# Patient Record
Sex: MALE | Race: WHITE | Employment: OTHER | URBAN - METROPOLITAN AREA
[De-identification: names, ages, dates, MRNs, and addresses within clinical notes are randomized per-mention and may not be internally consistent; named-entity substitution may affect disease eponyms.]

---

## 2024-11-20 ENCOUNTER — APPOINTMENT (OUTPATIENT)
Dept: GENERAL RADIOLOGY | Age: 79
End: 2024-11-20
Payer: MEDICARE

## 2024-11-20 ENCOUNTER — HOSPITAL ENCOUNTER (EMERGENCY)
Age: 79
Discharge: HOME | End: 2024-11-21
Attending: STUDENT IN AN ORGANIZED HEALTH CARE EDUCATION/TRAINING PROGRAM
Payer: MEDICARE

## 2024-11-20 DIAGNOSIS — R73.09 ELEVATED GLUCOSE LEVEL: ICD-10-CM

## 2024-11-20 DIAGNOSIS — N18.9 CHRONIC KIDNEY DISEASE, UNSPECIFIED CKD STAGE: ICD-10-CM

## 2024-11-20 DIAGNOSIS — B34.9 VIRAL ILLNESS: Primary | ICD-10-CM

## 2024-11-20 DIAGNOSIS — R50.9 FEVER, UNSPECIFIED FEVER CAUSE: ICD-10-CM

## 2024-11-20 LAB
ANION GAP SERPL CALCULATED.3IONS-SCNC: 14 MMOL/L (ref 9–17)
BASOPHILS # BLD: 0.26 K/UL (ref 0–0.2)
BASOPHILS NFR BLD: 2 % (ref 0–2)
BUN SERPL-MCNC: 24 MG/DL (ref 8–23)
CALCIUM SERPL-MCNC: 9 MG/DL (ref 8.6–10.4)
CHLORIDE SERPL-SCNC: 105 MMOL/L (ref 98–107)
CO2 SERPL-SCNC: 21 MMOL/L (ref 20–31)
CREAT SERPL-MCNC: 1.6 MG/DL (ref 0.7–1.2)
EOSINOPHIL # BLD: 0 K/UL (ref 0–0.4)
EOSINOPHILS RELATIVE PERCENT: 0 % (ref 1–4)
ERYTHROCYTE [DISTWIDTH] IN BLOOD BY AUTOMATED COUNT: 14.4 % (ref 12.5–15.4)
FLUAV AG SPEC QL: NEGATIVE
FLUBV AG SPEC QL: NEGATIVE
GFR, ESTIMATED: 44 ML/MIN/1.73M2
GLUCOSE SERPL-MCNC: 215 MG/DL (ref 70–99)
HCT VFR BLD AUTO: 40.9 % (ref 41–53)
HGB BLD-MCNC: 13.6 G/DL (ref 13.5–17.5)
LACTATE BLDV-SCNC: 1.8 MMOL/L (ref 0.5–2.2)
LYMPHOCYTES NFR BLD: 1.05 K/UL (ref 1–4.8)
LYMPHOCYTES RELATIVE PERCENT: 8 % (ref 24–44)
MCH RBC QN AUTO: 30.8 PG (ref 26–34)
MCHC RBC AUTO-ENTMCNC: 33.3 G/DL (ref 31–37)
MCV RBC AUTO: 92.7 FL (ref 80–100)
MONOCYTES NFR BLD: 0.92 K/UL (ref 0.1–0.8)
MONOCYTES NFR BLD: 7 % (ref 1–7)
MORPHOLOGY: NORMAL
NEUTROPHILS NFR BLD: 83 % (ref 36–66)
NEUTS SEG NFR BLD: 10.87 K/UL (ref 1.8–7.7)
PLATELET # BLD AUTO: 217 K/UL (ref 140–450)
PMV BLD AUTO: 7.5 FL (ref 6–12)
POTASSIUM SERPL-SCNC: 3.7 MMOL/L (ref 3.7–5.3)
RBC # BLD AUTO: 4.42 M/UL (ref 4.5–5.9)
SARS-COV-2 RDRP RESP QL NAA+PROBE: NOT DETECTED
SODIUM SERPL-SCNC: 140 MMOL/L (ref 135–144)
SPECIMEN DESCRIPTION: NORMAL
WBC OTHER # BLD: 13.1 K/UL (ref 3.5–11)

## 2024-11-20 PROCEDURE — 87804 INFLUENZA ASSAY W/OPTIC: CPT

## 2024-11-20 PROCEDURE — 80048 BASIC METABOLIC PNL TOTAL CA: CPT

## 2024-11-20 PROCEDURE — 87086 URINE CULTURE/COLONY COUNT: CPT

## 2024-11-20 PROCEDURE — 87635 SARS-COV-2 COVID-19 AMP PRB: CPT

## 2024-11-20 PROCEDURE — 85025 COMPLETE CBC W/AUTO DIFF WBC: CPT

## 2024-11-20 PROCEDURE — 99284 EMERGENCY DEPT VISIT MOD MDM: CPT

## 2024-11-20 PROCEDURE — 81001 URINALYSIS AUTO W/SCOPE: CPT

## 2024-11-20 PROCEDURE — 83605 ASSAY OF LACTIC ACID: CPT

## 2024-11-20 PROCEDURE — 71046 X-RAY EXAM CHEST 2 VIEWS: CPT

## 2024-11-20 PROCEDURE — 6370000000 HC RX 637 (ALT 250 FOR IP): Performed by: NURSE PRACTITIONER

## 2024-11-20 RX ORDER — ACETAMINOPHEN 500 MG
1000 TABLET ORAL ONCE
Status: COMPLETED | OUTPATIENT
Start: 2024-11-20 | End: 2024-11-20

## 2024-11-20 RX ADMIN — ACETAMINOPHEN 1000 MG: 500 TABLET ORAL at 22:51

## 2024-11-20 ASSESSMENT — PAIN - FUNCTIONAL ASSESSMENT: PAIN_FUNCTIONAL_ASSESSMENT: NONE - DENIES PAIN

## 2024-11-21 VITALS
HEIGHT: 68 IN | WEIGHT: 170 LBS | OXYGEN SATURATION: 93 % | TEMPERATURE: 98.8 F | RESPIRATION RATE: 14 BRPM | DIASTOLIC BLOOD PRESSURE: 66 MMHG | SYSTOLIC BLOOD PRESSURE: 126 MMHG | BODY MASS INDEX: 25.76 KG/M2 | HEART RATE: 94 BPM

## 2024-11-21 LAB
BACTERIA URNS QL MICRO: ABNORMAL
BILIRUB UR QL STRIP: NEGATIVE
CHARACTER UR: ABNORMAL
CLARITY UR: CLEAR
COLOR UR: YELLOW
EPI CELLS #/AREA URNS HPF: ABNORMAL /HPF (ref 0–5)
GLUCOSE UR STRIP-MCNC: NEGATIVE MG/DL
HGB UR QL STRIP.AUTO: ABNORMAL
KETONES UR STRIP-MCNC: NEGATIVE MG/DL
LEUKOCYTE ESTERASE UR QL STRIP: NEGATIVE
MUCOUS THREADS URNS QL MICRO: ABNORMAL
NITRITE UR QL STRIP: NEGATIVE
PH UR STRIP: 6.5 [PH] (ref 5–8)
PROT UR STRIP-MCNC: ABNORMAL MG/DL
RBC #/AREA URNS HPF: ABNORMAL /HPF (ref 0–2)
SP GR UR STRIP: 1.02 (ref 1–1.03)
UROBILINOGEN UR STRIP-ACNC: NORMAL EU/DL (ref 0–1)
WBC #/AREA URNS HPF: ABNORMAL /HPF (ref 0–5)

## 2024-11-21 NOTE — DISCHARGE INSTRUCTIONS
Take Tylenol as directed as needed for fevers or bodyaches    Take plenty of fluids to maintain hydration    Please follow-up with your primary care provider for reevaluation of your elevated blood sugar level additional testing will need to be performed for further evaluation of possible diabetes diagnosis    Call your primary care doctor office tomorrow.  Let them know you are seen in the emergency department had elevated blood sugars and was diagnosed with viral syndrome and need reevaluated by Friday    Monitor for signs and symptoms of hyperglycemia.  If any concerning symptoms arise please return immediately to the emergency department    If any symptoms worsen or any new or concerning symptoms arise please return immediately to the emergency department

## 2024-11-21 NOTE — ED PROVIDER NOTES
Neurologic: Moving all 4 extremities without difficulty there are no gross focal neurologic deficits   Skin: Warm and dry face is flushed    Physical Exam    MEDICAL DECISION MAKING   This is a nontoxic-appearing 79-year-old male presents emergency department via EMS for evaluation of fever and generalized weakness that began today.  Full gamut of testing was performed including EKG without any acute findings interpreted for my attending physician, chest x-ray without acute findings, influenza and COVID testing negative.  CBC with mildly elevated white blood cell count of 13.1.  Chemistry shows BUN and creatinine elevated which is chronic for him he has chronic kidney disease follows with nephrology.  His glucose was noted to be elevated 215.  He does have history of prediabetes according to his medical history.  He is not currently on any medications for diabetes.  Outside source medications were reviewed.  Urinalysis does not show any obvious signs of infection.  It was sent for culture.  Patient and significant other were updated on all results.  He was told to follow-up with his primary care provider in the next 2 days for reevaluation of symptoms fever, generalized fatigue and elevated blood sugar.  Patient was given discharge information and educated on the need for close follow-up.  He and his significant other verbalized importance verbalized understanding of above plan of care.  Patient is taking oral fluids without any nausea or vomiting.  He is up to ambulate without any difficulty.  Patient is significant other are comfortable being discharged home.  He is encouraged to return immediately if any of his symptoms worsen he develops any chest pain, signs of hyperglycemia, shortness of breath or any other concerning symptoms arise.  Stable for discharge management.    Differential diagnosis include COVID-19, influenza, pneumonia, viral syndrome, urinary tract infection,      DIAGNOSTIC RESULTS     EKG: All        All other labs were within normal range or not returned as of this dictation.    EMERGENCY DEPARTMENT COURSE and DIFFERENTIAL DIAGNOSIS/MDM:   Vitals:    Vitals:    11/20/24 2158 11/20/24 2200 11/21/24 0030 11/21/24 0051   BP: (!) 144/77 126/75 126/66    Pulse: (!) 117  94    Resp: 18  14    Temp: (!) 101.8 °F (38.8 °C)  98.8 °F (37.1 °C)    TempSrc: Oral  Oral    SpO2: 97% 96% 93% 93%   Weight: 77.1 kg (170 lb)      Height: 1.715 m (5' 7.5\")                REASSESSMENT     ED Course as of 11/21/24 0109   Wed Nov 20, 2024   2250 XR CHEST (2 VW)  FINDINGS:  The cardiomediastinal silhouette is normal size. No consolidation or venous  congestion.  A few linear parenchymal bands at the right lung base could  reflect subsegmental atelectasis or scar.No pleural effusion or pneumothorax  identified.     IMPRESSION:  No acute cardiopulmonary disease.   [MA]   2331 WBC(!): 13.1 [MA]   2331 Hemoglobin Quant: 13.6  No anemia [MA]   2331 SARS-CoV-2, Rapid: Not Detected [MA]   2331 Flu A Antigen: NEGATIVE [MA]   2332 Flu B Antigen: NEGATIVE [MA]   2332 Lactic Acid: 1.8  Not elevated [MA]      ED Course User Index  [MA] Delfina Covington, DO         PROCEDURES:  Unless otherwise noted below, none     Procedures      FINAL IMPRESSION      1. Viral illness    2. Fever, unspecified fever cause    3. Elevated glucose level    4. Chronic kidney disease, unspecified CKD stage          DISPOSITION/PLAN   DISPOSITION Decision To Discharge 11/21/2024 12:40:51 AM           PATIENT REFERRED TO:  Abdi Liang Jr., MD  [address]  [facility] [address] [phone]    Schedule an appointment as soon as possible for a visit in 2 days        DISCHARGE MEDICATIONS:  There are no discharge medications for this patient.    Controlled Substances Monitoring:          No data to display                (Please note that portions of this note were completed with a voice recognition program.  Efforts were made to edit the

## 2024-11-21 NOTE — ED PROVIDER NOTES
[facility] EMERGENCY DEPARTMENT  EMERGENCY DEPARTMENT ENCOUNTER  INDEPENDENT ATTESTATION         1. Viral illness    2. Fever, unspecified fever cause    3. Elevated glucose level    4. Chronic kidney disease, unspecified CKD stage          Pt Name: Jason Dobbs  MRN: [id number]  Birthdate 1945  Date of evaluation: 11/21/24    Jason Dobbs is a 79 y.o. male who presents with Fever (Pt arrives via ems from home dt fever and generalized weakness) and Fatigue  .  He did not physically examine this patient although Martha, NP and myself discussed workup, results, I did review chart,  available laboratory results and plan and disposition    Based on the medical record, the care appears appropriate. I was personally available for consultation in the Emergency Department.      FINAL IMPRESSION      1. Viral illness    2. Fever, unspecified fever cause    3. Elevated glucose level    4. Chronic kidney disease, unspecified CKD stage          DISPOSITION / PLAN     DISPOSITION Decision To Discharge 11/21/2024 12:40:51 AM           PATIENT REFERRED TO:  Abdi Liang Jr., MD  [address] [place] [id number] [phone]    Schedule an appointment as soon as possible for a visit in 2 days        DISCHARGE MEDICATIONS:  There are no discharge medications for this patient.      Dr. Delfina Covington DO   Attending Emergency Physician      Delfina Covington DO  11/21/24 8515

## 2024-11-22 LAB
MICROORGANISM SPEC CULT: NORMAL
SERVICE CMNT-IMP: NORMAL
SPECIMEN DESCRIPTION: NORMAL